# Patient Record
Sex: FEMALE | Race: BLACK OR AFRICAN AMERICAN
[De-identification: names, ages, dates, MRNs, and addresses within clinical notes are randomized per-mention and may not be internally consistent; named-entity substitution may affect disease eponyms.]

---

## 2018-12-25 PROBLEM — Z00.00 ENCOUNTER FOR PREVENTIVE HEALTH EXAMINATION: Status: ACTIVE | Noted: 2018-12-25

## 2019-01-24 ENCOUNTER — APPOINTMENT (OUTPATIENT)
Dept: GASTROENTEROLOGY | Facility: CLINIC | Age: 69
End: 2019-01-24

## 2019-02-13 ENCOUNTER — APPOINTMENT (OUTPATIENT)
Dept: GASTROENTEROLOGY | Facility: CLINIC | Age: 69
End: 2019-02-13
Payer: MEDICARE

## 2019-02-13 VITALS
BODY MASS INDEX: 19.67 KG/M2 | TEMPERATURE: 98.3 F | DIASTOLIC BLOOD PRESSURE: 60 MMHG | SYSTOLIC BLOOD PRESSURE: 160 MMHG | HEIGHT: 63 IN | HEART RATE: 104 BPM | OXYGEN SATURATION: 97 % | WEIGHT: 111 LBS

## 2019-02-13 DIAGNOSIS — Z94.0 KIDNEY TRANSPLANT STATUS: ICD-10-CM

## 2019-02-13 DIAGNOSIS — Z86.79 PERSONAL HISTORY OF OTHER DISEASES OF THE CIRCULATORY SYSTEM: ICD-10-CM

## 2019-02-13 DIAGNOSIS — Z80.3 FAMILY HISTORY OF MALIGNANT NEOPLASM OF BREAST: ICD-10-CM

## 2019-02-13 DIAGNOSIS — Z83.3 FAMILY HISTORY OF DIABETES MELLITUS: ICD-10-CM

## 2019-02-13 DIAGNOSIS — Z86.73 PERSONAL HISTORY OF TRANSIENT ISCHEMIC ATTACK (TIA), AND CEREBRAL INFARCTION W/OUT RESIDUAL DEFICITS: ICD-10-CM

## 2019-02-13 DIAGNOSIS — Z87.448 PERSONAL HISTORY OF OTHER DISEASES OF URINARY SYSTEM: ICD-10-CM

## 2019-02-13 DIAGNOSIS — Z87.39 PERSONAL HISTORY OF OTHER DISEASES OF THE MUSCULOSKELETAL SYSTEM AND CONNECTIVE TISSUE: ICD-10-CM

## 2019-02-13 PROCEDURE — 99204 OFFICE O/P NEW MOD 45 MIN: CPT

## 2019-02-13 RX ORDER — CLOPIDOGREL BISULFATE 75 MG/1
75 TABLET, FILM COATED ORAL
Refills: 0 | Status: ACTIVE | COMMUNITY

## 2019-02-13 RX ORDER — VITAMIN E (DL,TOCOPHERYL ACET) 180 MG
500 CAPSULE ORAL
Refills: 0 | Status: ACTIVE | COMMUNITY

## 2019-02-13 RX ORDER — TACROLIMUS 0.5 MG/1
CAPSULE ORAL
Refills: 0 | Status: ACTIVE | COMMUNITY

## 2019-02-13 RX ORDER — TIZANIDINE 2 MG/1
2 TABLET ORAL
Refills: 0 | Status: ACTIVE | COMMUNITY

## 2019-02-13 RX ORDER — MULTIVIT-MIN/FA/LYCOPEN/LUTEIN .4-300-25
TABLET ORAL
Refills: 0 | Status: ACTIVE | COMMUNITY

## 2019-02-13 RX ORDER — CINACALCET HYDROCHLORIDE 90 MG/1
90 TABLET, COATED ORAL
Refills: 0 | Status: ACTIVE | COMMUNITY

## 2019-02-13 RX ORDER — ALLOPURINOL 200 MG/1
TABLET ORAL
Refills: 0 | Status: ACTIVE | COMMUNITY

## 2019-02-13 RX ORDER — MYCOPHENILIC ACID 360 MG/1
360 TABLET, DELAYED RELEASE ORAL
Refills: 0 | Status: ACTIVE | COMMUNITY

## 2019-02-13 RX ORDER — CLONIDINE HYDROCHLORIDE 0.1 MG/1
0.1 TABLET ORAL
Refills: 0 | Status: ACTIVE | COMMUNITY

## 2019-02-13 RX ORDER — DILTIAZEM HYDROCHLORIDE 90 MG/1
TABLET ORAL
Refills: 0 | Status: ACTIVE | COMMUNITY

## 2019-02-13 NOTE — PHYSICAL EXAM
[General Appearance - Alert] : alert [General Appearance - In No Acute Distress] : in no acute distress [Neck Appearance] : the appearance of the neck was normal [Neck Cervical Mass (___cm)] : no neck mass was observed [Jugular Venous Distention Increased] : there was no jugular-venous distention [Thyroid Diffuse Enlargement] : the thyroid was not enlarged [Thyroid Nodule] : there were no palpable thyroid nodules [Auscultation Breath Sounds / Voice Sounds] : lungs were clear to auscultation bilaterally [Heart Rate And Rhythm] : heart rate was normal and rhythm regular [Heart Sounds] : normal S1 and S2 [Heart Sounds Gallop] : no gallops [Heart Sounds Pericardial Friction Rub] : no pericardial rub [Systolic grade ___/6] : A grade [unfilled]/6 systolic murmur was heard. [Edema] : there was no peripheral edema [Bowel Sounds] : normal bowel sounds [Abdomen Soft] : soft [Abdomen Tenderness] : non-tender [] : no hepato-splenomegaly [Abdomen Mass (___ Cm)] : no abdominal mass palpated [No CVA Tenderness] : no ~M costovertebral angle tenderness [No Spinal Tenderness] : no spinal tenderness [Oriented To Time, Place, And Person] : oriented to person, place, and time [Impaired Insight] : insight and judgment were intact [Affect] : the affect was normal

## 2019-02-15 NOTE — HISTORY OF PRESENT ILLNESS
[FreeTextEntry1] : The patient is a 68-year-old woman with a history of a kidney transplant in 2012 and a history of CVA in 2002 who has left-sided weakness and is on clopidogrel. She has a history of colonic polyps and history of reflux for which she takes ranitidine 150 mg a day. She denies heartburn but states that when she eats meat, he gets caught up in the throat. She denies regurgitation. She also denies abdominal pain. She has 1-2 solid bowel movements a day without melena or blood or blood per rectum. The patient's weight is stable. The patient has not been hospitalized in the past year but does report that she has "plaque in her heart".

## 2019-02-15 NOTE — CONSULT LETTER
[FreeTextEntry1] : Dear Dr. Vinod Llamas, Sherwin Sepulveda, and Dev Benavidez,\par \par I had the pleasure of seeing your patient LETY RODRIGUEZ in the office today.  My office note is attached.\par \par Thank you very much for allowing me to participate in the care of your patient.\par \par Sincerely,\par \par Oliverio Aparicio M.D., FAC, FACP\par Director, Celiac Program at [facility]\par  of Medicine\par [employer] at [facility] Practice Director,\par [facility] Gastroenterology/Internal Medicine at [place]\[facility] [address]\[place] [address]\par Tel: [phone]\[facility] Email: [email or web address]

## 2019-02-15 NOTE — ASSESSMENT
[FreeTextEntry1] : Patient with a history of colonic polyps and history of reflux. She reports dysphagia to meat. She has a history of kidney transplant and CVA and possible cardiac disease.\par \par Patient was sent for an esophagram to evaluate the dysphagia.\par \par An EGD and colonoscopy have been scheduled. The risks, benefits, alternatives, and limitations of the procedures, including the possibility of missed lesions, were explained. The patient will require cardiac and anesthesia clearance prior to the procedure and permission to hold clopidogrel for 5 days prior to the procedures.

## 2019-03-11 ENCOUNTER — APPOINTMENT (OUTPATIENT)
Dept: INTERNAL MEDICINE | Facility: CLINIC | Age: 69
End: 2019-03-11

## 2019-05-20 ENCOUNTER — APPOINTMENT (OUTPATIENT)
Dept: GASTROENTEROLOGY | Facility: AMBULATORY MEDICAL SERVICES | Age: 69
End: 2019-05-20
Payer: MEDICARE

## 2019-05-20 PROCEDURE — G0105: CPT

## 2019-05-20 PROCEDURE — 43239 EGD BIOPSY SINGLE/MULTIPLE: CPT

## 2019-07-09 ENCOUNTER — APPOINTMENT (OUTPATIENT)
Dept: GASTROENTEROLOGY | Facility: CLINIC | Age: 69
End: 2019-07-09
Payer: MEDICARE

## 2019-07-09 VITALS
HEART RATE: 94 BPM | TEMPERATURE: 98.4 F | OXYGEN SATURATION: 98 % | BODY MASS INDEX: 20.02 KG/M2 | DIASTOLIC BLOOD PRESSURE: 70 MMHG | HEIGHT: 63 IN | WEIGHT: 113 LBS | SYSTOLIC BLOOD PRESSURE: 104 MMHG

## 2019-07-09 DIAGNOSIS — Z86.010 PERSONAL HISTORY OF COLONIC POLYPS: ICD-10-CM

## 2019-07-09 DIAGNOSIS — R13.10 DYSPHAGIA, UNSPECIFIED: ICD-10-CM

## 2019-07-09 DIAGNOSIS — K21.9 GASTRO-ESOPHAGEAL REFLUX DISEASE W/OUT ESOPHAGITIS: ICD-10-CM

## 2019-07-09 DIAGNOSIS — K64.8 OTHER HEMORRHOIDS: ICD-10-CM

## 2019-07-09 DIAGNOSIS — K64.4 RESIDUAL HEMORRHOIDAL SKIN TAGS: ICD-10-CM

## 2019-07-09 DIAGNOSIS — K44.9 DIAPHRAGMATIC HERNIA W/OUT OBSTRUCTION OR GANGRENE: ICD-10-CM

## 2019-07-09 DIAGNOSIS — K57.30 DIVERTICULOSIS OF LARGE INTESTINE W/OUT PERFORATION OR ABSCESS W/OUT BLEEDING: ICD-10-CM

## 2019-07-09 PROCEDURE — 99213 OFFICE O/P EST LOW 20 MIN: CPT

## 2019-07-09 RX ORDER — RANITIDINE 150 MG/1
150 TABLET ORAL TWICE DAILY
Qty: 180 | Refills: 1 | Status: ACTIVE | COMMUNITY
Start: 1900-01-01 | End: 1900-01-01

## 2019-07-09 RX ORDER — POLYETHYLENE GLYCOL 3350, SODIUM SULFATE, SODIUM CHLORIDE, POTASSIUM CHLORIDE, ASCORBIC ACID, SODIUM ASCORBATE 7.5-2.691G
100 KIT ORAL
Qty: 1 | Refills: 0 | Status: DISCONTINUED | COMMUNITY
Start: 2019-02-13 | End: 2019-07-09

## 2019-07-10 PROBLEM — K21.9 GASTROESOPHAGEAL REFLUX DISEASE: Status: ACTIVE | Noted: 2019-07-10

## 2019-07-10 PROBLEM — K21.9 GERD (GASTROESOPHAGEAL REFLUX DISEASE): Noted: 2019-02-13

## 2019-07-10 PROBLEM — K64.4 EXTERNAL HEMORRHOID: Status: ACTIVE | Noted: 2019-07-10

## 2019-07-10 PROBLEM — K44.9 HIATAL HERNIA: Status: ACTIVE | Noted: 2019-07-10

## 2019-07-10 PROBLEM — R13.10 DYSPHAGIA: Status: ACTIVE | Noted: 2019-02-13

## 2019-07-10 PROBLEM — Z86.010 HISTORY OF ADENOMATOUS POLYP OF COLON: Status: ACTIVE | Noted: 2019-02-13

## 2019-07-10 PROBLEM — K57.30 DIVERTICULOSIS OF LARGE INTESTINE WITHOUT HEMORRHAGE: Status: ACTIVE | Noted: 2019-07-10

## 2019-07-10 PROBLEM — K64.8 HEMORRHOIDS, INTERNAL: Status: ACTIVE | Noted: 2019-07-10

## 2019-07-10 NOTE — REASON FOR VISIT
[Initial Evaluation] : an initial evaluation [FreeTextEntry1] : GERD, dysphagia, h/o polyps [Follow-Up: _____] : a [unfilled] follow-up visit

## 2019-07-11 NOTE — HISTORY OF PRESENT ILLNESS
[FreeTextEntry1] : The patient had an esophagram on February 26, 2019 which showed abnormal motility was slightly delayed emptying likely due to presbyesophagus and mild gastroesophageal reflux. She underwent EGD and colonoscopy on May 20, 2019. EGD revealed a 2 cm hiatal hernia with an irregular Z line and 4 antral nodules but biopsies were negative. Colonoscopy done for a history of colonic polyps revealed right colonic diverticulosis and internal and external hemorrhoids which are asymptomatic. The patient denies heartburn but does get this sensation of reflux while taking ranitidine 150 mg a day. The patient states that her dysphagia symptoms have improved since the endoscopy. She denies abdominal pain. Bowel movements are normal without melena or bright red blood per rectum.\par \par \par Note from 2/13/19 - The patient is a 68-year-old woman with a history of a kidney transplant in 2012 and a history of CVA in 2002 who has left-sided weakness and is on clopidogrel. She has a history of colonic polyps and history of reflux for which she takes ranitidine 150 mg a day. She denies heartburn but states that when she eats meat, he gets caught up in the throat. She denies regurgitation. She also denies abdominal pain. She has 1-2 solid bowel movements a day without melena or blood or blood per rectum. The patient's weight is stable. The patient has not been hospitalized in the past year but does report that she has "plaque in her heart".

## 2019-07-11 NOTE — ASSESSMENT
[FreeTextEntry1] : Patient with a hiatal hernia who has symptoms of reflux that are not fully controlled with ranitidine 150 mg a day. She also has a history of colonic polyps and diverticulosis.\par \par We will increase ranitidine to 150 mg b.i.d.\par \par Information was given to the patient regarding diverticulosis and a high-fiber diet.\par \par We'll repeat a colonoscopy in May 2022.\par \par Patient will return to see me in one year or sooner if needed.\par \par \par Plan from 2/13/19 - Patient with a history of colonic polyps and history of reflux. She reports dysphagia to meat. She has a history of kidney transplant and CVA and possible cardiac disease.\par \par Patient was sent for an esophagram to evaluate the dysphagia.\par \par An EGD and colonoscopy have been scheduled. The risks, benefits, alternatives, and limitations of the procedures, including the possibility of missed lesions, were explained. The patient will require cardiac and anesthesia clearance prior to the procedure and permission to hold clopidogrel for 5 days prior to the procedures.

## 2019-07-11 NOTE — CONSULT LETTER
[FreeTextEntry1] : Dear Dr. Vinod Llamas, Sherwin Sepulveda, and Dev Benavidez,\par \par I had the pleasure of seeing your patient LETY RODRIGUEZ in the office today.  My office note is attached.\par \par Thank you very much for allowing me to participate in the care of your patient.\par \par Sincerely,\par \par Oliverio Aparicio M.D., FAC, [facility] Director, Celiac Program at [facility]  of Medicine\[facility] [employer] at [facility] Practice Director,\[facility] - Gastroenterology/Internal Medicine at [place]\[facility] [address]\[place] [address]\[facility] Tel: [phone]\[facility] Email: [email or web address] \par \par \[facility] The attached note has been created using a voice recognition system (Dragon).  There may be some misspellings and typos.  Please call my office if you have any issues or questions.

## 2020-10-01 PROBLEM — Z94.0 KIDNEY TRANSPLANT RECIPIENT: Status: RESOLVED | Noted: 2019-02-13 | Resolved: 2020-10-01
